# Patient Record
Sex: FEMALE | Race: AMERICAN INDIAN OR ALASKA NATIVE
[De-identification: names, ages, dates, MRNs, and addresses within clinical notes are randomized per-mention and may not be internally consistent; named-entity substitution may affect disease eponyms.]

---

## 2018-06-28 ENCOUNTER — HOSPITAL ENCOUNTER (EMERGENCY)
Dept: HOSPITAL 5 - ED | Age: 38
Discharge: HOME | End: 2018-06-28
Payer: COMMERCIAL

## 2018-06-28 VITALS — DIASTOLIC BLOOD PRESSURE: 68 MMHG | SYSTOLIC BLOOD PRESSURE: 118 MMHG
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
## 2018-06-28 DIAGNOSIS — S63.592A: Primary | ICD-10-CM
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
## 2018-06-28 PROCEDURE — 99283 EMERGENCY DEPT VISIT LOW MDM: CPT

## 2018-06-28 PROCEDURE — 71046 X-RAY EXAM CHEST 2 VIEWS: CPT

## 2018-06-28 NOTE — XRAY REPORT
FINAL REPORT



PROCEDURE:  Right wrist. 



TECHNIQUE:  AP and lateral views.



HISTORY:  Right wrist pain. 



COMPARISON:  No prior studies are available for comparison.



FINDINGS:  

The bones appear intact without fracture or dislocation. The

joint spaces appear normal. The soft tissues are unremarkable.



IMPRESSION:  

Normal study.
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
## 2018-06-28 NOTE — XRAY REPORT
FINAL REPORT



PROCEDURE:  Left shoulder. 



TECHNIQUE:  Three views.



HISTORY:  left shoulder pain 



COMPARISON:  No prior studies are available for comparison.



FINDINGS:  

The bones appear intact without fracture or dislocation. The

joint spaces appear normal. The soft tissues are unremarkable.



IMPRESSION:  

Normal study.

## 2018-06-28 NOTE — XRAY REPORT
FINAL REPORT



PROCEDURE:  Left wrist. 



TECHNIQUE:  AP and lateral views.



HISTORY:  Left wrist pain. 



COMPARISON:  No prior studies are available for comparison.



FINDINGS:  

The bones appear intact without fracture or dislocation. The

joint spaces appear normal. The soft tissues are unremarkable.



IMPRESSION:  

Normal study.
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
## 2019-09-13 ENCOUNTER — HOSPITAL ENCOUNTER (EMERGENCY)
Dept: HOSPITAL 5 - ED | Age: 39
Discharge: HOME | End: 2019-09-13
Payer: COMMERCIAL

## 2019-09-13 VITALS — SYSTOLIC BLOOD PRESSURE: 146 MMHG | DIASTOLIC BLOOD PRESSURE: 97 MMHG

## 2019-09-13 DIAGNOSIS — G56.01: Primary | ICD-10-CM

## 2019-09-13 PROCEDURE — 29125 APPL SHORT ARM SPLINT STATIC: CPT

## 2019-09-13 PROCEDURE — 96372 THER/PROPH/DIAG INJ SC/IM: CPT

## 2019-09-13 PROCEDURE — 73140 X-RAY EXAM OF FINGER(S): CPT

## 2019-09-13 PROCEDURE — 99284 EMERGENCY DEPT VISIT MOD MDM: CPT

## 2019-09-13 NOTE — XRAY REPORT
Right fingers, 3 views



INDICATION:  3rd PIP pain, decreased ROM. 



COMPARISON: None.



IMPRESSION:  No acute osseous or soft tissue abnormality.    No significant DJD.



Signer Name: Shashank Suárez Jr, MD 

Signed: 9/13/2019 3:33 PM

 Workstation Name: [id number]

## 2019-09-13 NOTE — EMERGENCY DEPARTMENT REPORT
ED Upper Extremity Inj HPI





- General


Chief Complaint: Extremity Injury, Upper


Stated Complaint: RT MIDDLE FINGER STUCK/PAIN


Time Seen by Provider: 09/13/19 14:33


Source: patient


Mode of arrival: Ambulatory


Limitations: No Limitations





- History of Present Illness


Initial Comments: 





39-year-old female presents to the hospital complaining of right middle finger 

pain and stiffness 1 week.  Patient writes with her left hand but states she 

uses her right hand for pretty much everything else.  No trauma reported.  

Patient recently had outpatient nerve test done on August 19 that showed signs 

of carpal tunnel.  Patient states the needle was placed in the nerves of her 

fingers as well a her neck.  The last week she has had pain along the whole 

middle finger with swelling and also has pain and extending to the proximal 

forearm.  Patient denies fever or drainage.  She has been prescribed gabapentin,

tramadol, and unknown other medication for nerve pain.  He should discontinue 

all these medications she states is not working.  She denies being on a steroid 

currently.





- Related Data


                                  Previous Rx's











 Medication  Instructions  Recorded  Last Taken  Type


 


Cyclobenzaprine HCl [Flexeril 5 MG 5 mg PO TID #30 tab 06/28/18 Unknown Rx





TAB]    


 


Diclofenac Sodium [Voltaren] 100 gm TP Q6H PRN #1 gel..gram. 06/28/18 Unknown Rx


 


Famotidine [Pepcid] 20 mg PO BID #14 tablet 09/13/19 Unknown Rx


 


HYDROcodone/APAP 5-325 [[place] 1 each PO Q4HR PRN #14 tablet 09/13/19 Unknown Rx





5/325]    


 


Ibuprofen [Motrin] 800 mg PO Q8HR PRN #30 tablet 09/13/19 Unknown Rx


 


predniSONE [Deltasone] 20 mg PO BID #10 tab 09/13/19 Unknown Rx











                                    Allergies











Allergy/AdvReac Type Severity Reaction Status Date / Time


 


No Known Allergies Allergy   Unverified 06/28/18 13:27














ED Review of Systems


ROS: 


Stated complaint: RT MIDDLE FINGER STUCK/PAIN


Other details as noted in HPI





Comment: All other systems reviewed and negative





ED Past Medical Hx





- Past Medical History


Previous Medical History?: No





- Surgical History


Past Surgical History?: No





- Social History


Smoking Status: Never Smoker


Substance Use Type: None





- Medications


Home Medications: 


                                Home Medications











 Medication  Instructions  Recorded  Confirmed  Last Taken  Type


 


Cyclobenzaprine HCl [Flexeril 5 MG 5 mg PO TID #30 tab 06/28/18  Unknown Rx





TAB]     


 


Diclofenac Sodium [Voltaren] 100 gm TP Q6H PRN #1 gel..gram. 06/28/18  Unknown 

Rx


 


Famotidine [Pepcid] 20 mg PO BID #14 tablet 09/13/19  Unknown Rx


 


HYDROcodone/APAP 5-325 [[place] 1 each PO Q4HR PRN #14 tablet 09/13/19  Unknown Rx





5/325]     


 


Ibuprofen [Motrin] 800 mg PO Q8HR PRN #30 tablet 09/13/19  Unknown Rx


 


predniSONE [Deltasone] 20 mg PO BID #10 tab 09/13/19  Unknown Rx














ED Physical Exam





- General


Limitations: No Limitations





- Other


Other exam information: 





Gen.: No acute distress


Head: Atraumatic


Eyes: Normal appearance


ENT: Moist mucous membranes


Neck: Normal appearance


Chest: Clear to auscultation bilaterally


Cardiovascular: Regular rate and rhythm


Abdomen: Normal appearance, soft, nontender, no rebound or guarding, normal 

bowel sounds


Back: Normal appearance, nontender


Extremity: Patient has mild swelling to the radial side of the right forearm 

adjacent to the wrists.  She also has mild swelling to the right middle finger 

and tenderness at the MTP, PIP, and DIP joints.  There is not a sausage digit 

appearance.  Patient has pain with both flexion and extension as well as 

palpation.  No warmth or erythema noted.  Patient able to fully flex finger but 

has limited flexion at the DIP joint. 


Neuro: Alert, clear speech, no focal motor or sensory deficit


Psychiatric: Appropriate


Skin: No rash





ED Course





                                   Vital Signs











  09/13/19





  14:33


 


Temperature 97.7 F


 


Pulse Rate 77


 


Respiratory 16





Rate 


 


Blood Pressure 149/93


 


O2 Sat by Pulse 100





Oximetry 














ED Medical Decision Making





- Radiology Data


Radiology results: report reviewed








Right fingers, 3 views





INDICATION: 3rd PIP pain, decreased ROM.





COMPARISON: None.





IMPRESSION: No acute osseous or soft tissue abnormality. No significant DJD.





- Medical Decision Making





Patient having ongoing outpatient workup for her neuropathy type of pain 

arthralgias to her hands.  X-ray unremarkable.  Patient discontinued her 

medications because they were helping.  She supposedly follow up with orthopedic

 specialists in several weeks.  Patient presents here because she could not take

 the discomfort anymore.  Differential includes flexor tenderness in the right 

is however, patient does not have a sausage digit, warmth, or erythema.  

Symptoms have been ongoing 1 week.  Patient placed in a splint for comfort and 

will be discharged with additional meds





- Differential Diagnosis


fracture, contusion, sprain


Critical Care Time: No


Critical care attestation.: 


If time is entered above; I have spent that time in minutes in the direct care 

of this critically ill patient, excluding procedure time.








ED Disposition


Clinical Impression: 


 Carpal tunnel syndrome, Pain of right middle finger





Disposition: DC-01 TO HOME OR SELFCARE


Is pt being admited?: No


Does the pt Need Aspirin: No


Condition: Stable


Instructions:  Carpal Tunnel Syndrome (ED), Arthralgia (ED)


Additional Instructions: 


Take the medication as prescribed.  Follow-up with your doctor or with the 

doctor/clinic provided.  Return if symptoms worsen as indicated by your 

discharge instructions.


Prescriptions: 


predniSONE [Deltasone] 20 mg PO BID #10 tab


Ibuprofen [Motrin] 800 mg PO Q8HR PRN #30 tablet


 PRN Reason: Pain, Moderate (4-6)


HYDROcodone/APAP 5-325 [[place] 5/325] 1 each PO Q4HR PRN #14 tablet


 PRN Reason: Pain


Famotidine [Pepcid] 20 mg PO BID #14 tablet


Referrals: 


[facility] [Referring] - 3-5 Days


your, orthopedic doctor [Other] - 3-5 Days


Time of Disposition: 17:19

## 2019-09-13 NOTE — EVENT NOTE
ED Screening Note


Date of service: 09/13/19


Time: 14:33


ED Screening Note: 





This is a 39 y.o. F. that presents to the ER with right 3rd finger pain.





Patient went to a Orthopedic Surgeon last week and had a nerve study which found

carpal tunnel syndrome.





Denies injury





This initial assessment/diagnostic orders/clinical plan/treatment(s) is/are 

subject to change based on patients health status, clinical progression and re-

assessment by fellow clinical providers in the ED. Further treatment and workup 

at subsequent clinical providers discretion. Patient/guardian urged not to elope

from the ED as their condition may be serious if not clinically assessed and 

managed. 





Initial orders include: 





XR right fingers

## 2019-12-20 ENCOUNTER — HOSPITAL ENCOUNTER (EMERGENCY)
Dept: HOSPITAL 5 - ED | Age: 39
Discharge: HOME | End: 2019-12-20
Payer: COMMERCIAL

## 2019-12-20 VITALS — DIASTOLIC BLOOD PRESSURE: 95 MMHG | SYSTOLIC BLOOD PRESSURE: 147 MMHG

## 2019-12-20 DIAGNOSIS — N20.0: ICD-10-CM

## 2019-12-20 DIAGNOSIS — N30.01: Primary | ICD-10-CM

## 2019-12-20 DIAGNOSIS — Z79.899: ICD-10-CM

## 2019-12-20 DIAGNOSIS — Z79.1: ICD-10-CM

## 2019-12-20 LAB
BACTERIA #/AREA URNS HPF: (no result) /HPF
BILIRUB UR QL STRIP: (no result)
BLOOD UR QL VISUAL: (no result)
MUCOUS THREADS #/AREA URNS HPF: (no result) /HPF
PH UR STRIP: 6 [PH] (ref 5–7)
RBC #/AREA URNS HPF: > 182 /HPF (ref 0–6)
UROBILINOGEN UR-MCNC: < 2 MG/DL (ref ?–2)
WBC #/AREA URNS HPF: > 182 /HPF (ref 0–6)

## 2019-12-20 PROCEDURE — 81001 URINALYSIS AUTO W/SCOPE: CPT

## 2019-12-20 PROCEDURE — 81025 URINE PREGNANCY TEST: CPT

## 2019-12-20 NOTE — EMERGENCY DEPARTMENT REPORT
ED Female  HPI





- General


Chief complaint: Abdominal Pain


Stated complaint: ABD PAIN/BLOOD IN URINE


Time Seen by Provider: 19 12:34


Source: patient


Mode of arrival: Ambulatory


Limitations: No Limitations





- History of Present Illness


Initial comments: 





This is a 39-year-old female that presents to the ER with pelvic pressure, 

dysuria, urinary frequency, hematuria, and flank pain for 2 days.  Patient 

reports flank pain is only during urination.  LMP in years due to implanted IUD.

 She is taking ibuprofen which is controlling pain.  Patient reports history of 

kidney stone but states this pain is nothing compared to kidney stones and don't

think related.  Denies vaginal discharge, risk of pregnancy, fever, chills, 

nausea or vomiting.


MD Complaint: dysuria, pelvic pain


Onset/Timin


-: days(s)


Location: suprapubic


Radiation: L flank


Severity: mild


Severity scale (0 -10): 3


Quality: burning, other (pressure)


Consistency: intermittent


Improves with: none


Worsens with: urination


Are you Pregnant Now?: No


Last Menstrual Period: 17 (IUD)


EDC: 09/10/18


Associated Symptoms: abdominal pain, dysuria, hematuria.  denies: vaginal 

discharge, vaginal bleeding, nausea/vomiting, fever/chills, headaches, rash, 

seizure, shortness of breath





- Related Data


Sexually active: Yes


                                  Previous Rx's











 Medication  Instructions  Recorded  Last Taken  Type


 


Cyclobenzaprine HCl [Flexeril 5 MG 5 mg PO TID #30 tab 18 Unknown Rx





TAB]    


 


Diclofenac Sodium [Voltaren] 100 gm TP Q6H PRN #1 gel..gram. 18 Unknown Rx


 


Famotidine [Pepcid] 20 mg PO BID #14 tablet 19 Unknown Rx


 


HYDROcodone/APAP 5-325 [[place] 1 each PO Q4HR PRN #14 tablet 19 Unknown Rx





5/325]    


 


predniSONE [Deltasone] 20 mg PO BID #10 tab 19 Unknown Rx


 


Ibuprofen [Motrin 800 MG tab] 800 mg PO Q8HR PRN #30 tablet 19 Unknown Rx


 


Sulfamethoxazole/Trimethoprim 1 each PO BID #6 tablet 19 Unknown Rx





[Bactrim DS TAB]    











                                    Allergies











Allergy/AdvReac Type Severity Reaction Status Date / Time


 


No Known Allergies Allergy   Verified 19 11:28














ED Review of Systems


ROS: 


Stated complaint: ABD PAIN/BLOOD IN URINE


Other details as noted in HPI





Constitutional: denies: chills, fever


Respiratory: denies: cough, shortness of breath, wheezing


Cardiovascular: denies: chest pain, palpitations


Gastrointestinal: abdominal pain.  denies: nausea, diarrhea


Genitourinary: dysuria, frequency, hematuria.  denies: urgency, discharge, 

abnormal menses, dyspareunia


Skin: denies: rash, lesions


Neurological: denies: headache, weakness, paresthesias


Psychiatric: denies: anxiety, depression





ED Past Medical Hx





- Past Medical History


Additional medical history: KIDNEY STONES





- Social History


Smoking Status: Never Smoker


Substance Use Type: None





- Medications


Home Medications: 


                                Home Medications











 Medication  Instructions  Recorded  Confirmed  Last Taken  Type


 


Cyclobenzaprine HCl [Flexeril 5 MG 5 mg PO TID #30 tab 18  Unknown Rx





TAB]     


 


Diclofenac Sodium [Voltaren] 100 gm TP Q6H PRN #1 gel..gram. 18  Unknown 

Rx


 


Famotidine [Pepcid] 20 mg PO BID #14 tablet 19  Unknown Rx


 


HYDROcodone/APAP 5-325 [[place] 1 each PO Q4HR PRN #14 tablet 19  Unknown Rx





5/325]     


 


predniSONE [Deltasone] 20 mg PO BID #10 tab 19  Unknown Rx


 


Ibuprofen [Motrin 800 MG tab] 800 mg PO Q8HR PRN #30 tablet 19  Unknown Rx


 


Sulfamethoxazole/Trimethoprim 1 each PO BID #6 tablet 19  Unknown Rx





[Bactrim DS TAB]     














ED Physical Exam





- General


Limitations: No Limitations


General appearance: alert, in no apparent distress, obese





- Respiratory


Respiratory exam: Present: normal lung sounds bilaterally.  Absent: respiratory 

distress





- Cardiovascular


Cardiovascular Exam: Present: regular rate, normal rhythm.  Absent: systolic 

murmur, diastolic murmur, rubs, gallop





- GI/Abdominal


GI/Abdominal exam: Present: soft, normal bowel sounds.  Absent: distended, 

tenderness, guarding, rebound, rigid





- Back Exam


Back exam: Absent: CVA tenderness (R), CVA tenderness (L)





- Neurological Exam


Neurological exam: Present: alert, oriented X3, normal gait





- Psychiatric


Psychiatric exam: Present: normal affect, normal mood





- Skin


Skin exam: Present: warm, dry, intact, normal color.  Absent: rash





ED Medical Decision Making





- Lab Data








                                   Lab Results











  19 Range/Units





  11:48 


 


Urine Color  Yellow  (Yellow)  


 


Urine Turbidity  Cloudy  (Clear)  


 


Urine pH  6.0  (5.0-7.0)  


 


Ur Specific Gravity  1.016  (1.003-1.030)  


 


Urine Protein  100 mg/dl  (Negative)  mg/dL


 


Urine Glucose (UA)  Neg  (Negative)  mg/dL


 


Urine Ketones  Neg  (Negative)  mg/dL


 


Urine Blood  Lg  (Negative)  


 


Urine Nitrite  Neg  (Negative)  


 


Ur Reducing Substances  Not Reportable  


 


Urine Bilirubin  Neg  (Negative)  


 


Urine Ictotest  Not Reportable  


 


Urine Urobilinogen  < 2.0  (<2.0)  mg/dL


 


Ur Leukocyte Esterase  Lg  (Negative)  


 


Urine WBC (Auto)  > 182.0 H  (0.0-6.0)  /HPF


 


Urine RBC (Auto)  > 182.0  (0.0-6.0)  /HPF


 


U Epithel Cells (Auto)  4.0  (0-13.0)  /HPF


 


Urine Bacteria (Auto)  1+  (Negative)  /HPF


 


Urine WBC Clumps  2+  /HPF


 


Urine Mucus  Few  /HPF


 


Urine HCG, Qual  Negative  (Negative)  














- Medical Decision Making





This is a 39 y.o. F. that presents to the ER with pelvic cramping, dysuria, 

urinary frequency, and hematuria for 2 days.  Labs obtained.  Negative pregnancy

test.  Nontender to palpation on abdominal exam, negative CVA tenderness.  

Urinalysis positive for acute cystitis.  Start bactrim DS and ibuprofen for 

acute cystitis.  Referral to PCP for follow up.  Return to the ER with worsening

symptoms.  Patient discharged home stable.





- Differential Diagnosis


Acute cystitis, pyelonephritis, nephrolithiasis


Critical care attestation.: 


If time is entered above; I have spent that time in minutes in the direct care 

of this critically ill patient, excluding procedure time.








ED Disposition


Clinical Impression: 


 Urinary frequency, Dysuria





Acute cystitis


Qualifiers:


 Hematuria presence: with hematuria Qualified Code(s): N30.01 - Acute cystitis 

with hematuria





Hematuria


Qualifiers:


 Hematuria type: other microscopic Qualified Code(s): R31.29 - Other microscopic

hematuria; R31.2 - Other microscopic hematuria





Disposition:  TO HOME OR SELFCARE


Is pt being admited?: No


Condition: Stable


Instructions:  Abdominal Pain (ED), Urinary Tract Infection in Women (ED)


Additional Instructions: 


Increase fluid intake to 1L-2L daily.


Complete full course of antibiotics as prescribed.


Follow-up with a primary care doctor in 3-5 days or if symptoms worsen and 

continue return to the emergency department as soon as possible.





Prescriptions: 


Sulfamethoxazole/Trimethoprim [Bactrim DS TAB] 1 each PO BID #6 tablet


Ibuprofen [Motrin 800 MG tab] 800 mg PO Q8HR PRN #30 tablet


 PRN Reason: Pain, Moderate (4-6)


Referrals: 


[facility] [Provider Group] - 3-5 Days


[facility] [Provider Group] - 3-5 Days


[facility] [Provider Group] - 3-5 Days


Forms:  Work/School Release Form(ED)


Time of Disposition: 13:07